# Patient Record
Sex: MALE | Race: WHITE
[De-identification: names, ages, dates, MRNs, and addresses within clinical notes are randomized per-mention and may not be internally consistent; named-entity substitution may affect disease eponyms.]

---

## 2019-02-02 ENCOUNTER — HOSPITAL ENCOUNTER (EMERGENCY)
Dept: HOSPITAL 44 - ED | Age: 29
Discharge: HOME | End: 2019-02-02
Payer: COMMERCIAL

## 2019-02-02 VITALS — SYSTOLIC BLOOD PRESSURE: 137 MMHG | DIASTOLIC BLOOD PRESSURE: 89 MMHG

## 2019-02-02 DIAGNOSIS — K59.00: Primary | ICD-10-CM

## 2019-02-02 LAB
BASOPHILS NFR BLD: 0.8 % (ref 0–1.5)
EGFR (NON-AFRICAN): > 60
EOSINOPHIL NFR BLD: 2.6 % (ref 0–6.8)
MCH RBC QN AUTO: 32.5 PG (ref 28–34)
MCV RBC AUTO: 95 FL (ref 80–100)
MONOCYTES %: 6.2 % (ref 0–11)
NEUTROPHILS #: 2.9 # K/UL (ref 1.4–7.7)

## 2019-02-02 PROCEDURE — 80053 COMPREHEN METABOLIC PANEL: CPT

## 2019-02-02 PROCEDURE — 85025 COMPLETE CBC W/AUTO DIFF WBC: CPT

## 2019-02-02 PROCEDURE — 96374 THER/PROPH/DIAG INJ IV PUSH: CPT

## 2019-02-02 PROCEDURE — 81002 URINALYSIS NONAUTO W/O SCOPE: CPT

## 2019-02-02 PROCEDURE — 99284 EMERGENCY DEPT VISIT MOD MDM: CPT

## 2019-02-02 PROCEDURE — 99283 EMERGENCY DEPT VISIT LOW MDM: CPT

## 2019-02-02 PROCEDURE — 36415 COLL VENOUS BLD VENIPUNCTURE: CPT

## 2019-02-02 PROCEDURE — S1016 NON-PVC INTRAVENOUS ADMINIST: HCPCS

## 2019-02-02 PROCEDURE — 74018 RADEX ABDOMEN 1 VIEW: CPT

## 2019-02-02 NOTE — ED PHYSICIAN DOCUMENTATION
General Adult





- HISTORIAN


Historian: patient, parent





- HPI


Stated Complaint: n/v/diarrhea


Chief Complaint: General Adult


Onset: days ago (6)


Timing: still present


Severity: moderate


Further Comments: yes (Pt is a 28 yo male with c/o n/v, cough x 1 week.  Pt has 

had some episodes diarrhea.)





- ROS


CONST: no problems


EYES/ENT: none


CVS/RESP: cough


GI/: abdominal pain, vomiting, nausea, diarrhea


MS/SKIN/LYMPH: none





- PAST HX


Past History: other (ADHD, GERD, hx behavior problems.)


Surgeries/Procedures: other (tonsillectomy)


Allergies/Adverse Reactions: 


                                    Allergies











Allergy/AdvReac Type Severity Reaction Status Date / Time


 


codeine Allergy   Verified 03/29/15 14:02














Home Medications: 


                                Ambulatory Orders











 Medication  Instructions  Recorded


 


Oxcarbazepine [Trileptal] 300 mg PO BID 03/29/15


 


Sertraline HCl [Zoloft] 100 mg PO DAILY 03/29/15


 


Omeprazole 40 mg PO D 02/02/19














- SOCIAL HX


Smoking History: non-smoker





- FAMILY HX


Family History: No





- VITAL SIGNS


Vital Signs: 





                                   Vital Signs











Temp Pulse Resp BP Pulse Ox


 


          135/78    


 


          03/29/15 15:02   














- REVIEWED ASSESSMENTS


Nursing Assessment  Reviewed: Yes


Vitals Reviewed: Yes





Progress





- Progress


Progress: 





NS 1 L IVF x 2


Zofran 4 mg IV





x-ray abd:  Increased stool in the colon.


 





Milk of Magnesia, 1  in ER, 1 --> home.

















General Adult Physical Exam





- PHYSICAL EXAM


GENERAL APPEARANCE: mild distress


EENT: pharynx normal


NECK: normal inspection, supple


RESPIRATORY: no resp distress, chest non-tender, breath sounds normal


CVS: reg rate & rhythm, heart sounds normal


ABDOMEN: soft, normal bowel sounds, tenderness (lower abd tenderness)


BACK: normal inspection, no CVA tenderness


SKIN: warm/dry, normal color


EXTREMITIES: non-tender, normal range of motion, no evidence of injury


NEURO: oriented X3, motor nml, sensation nml





Discharge


Clincal Impression: 


 constipation





Referrals: 


Primary Doctor,No [Primary Care Provider] - 


Condition: Stable


Disposition: 01 HOME, SELF-CARE


Decision to Admit: NO


Decision Time: 04:24

## 2019-02-02 NOTE — DIAGNOSTIC IMAGING REPORT
MANDO MEYERS 

[facility]

[address] [facility] [address] [address]

 

 

 

 

Report Submission Date: 2019 2:36:34 AM CST

Patient       Study

Name:   BLAINE DAVE       Date:   2019 2:03:57 AM CST

MRN:   [id number]       Modality Type:   DX

Gender:   M       Description:   ABDOMEN 1 VIEW

:   89       Institution:   [facility]

Physician:   MANDO MEYERS

     Accession:    [id number]

 

 

One view abdomen 

Clinical history:  Mid epigastric pain for 2 days. 

Findings:  Examination of the abdomen single supine view demonstrates stool in 
the proximal colon.  Psoas margins are well defined and the properitoneal fat 
lines are preserved.  The visualized visceral silhouettes are within normal 
limits. 

Impression: 

1. Increased stool in the colon.

 

Electronically signed on 2019 2:36:34 AM CST by:

Deuce ROCK

## 2019-02-04 LAB
APPEARANCE UR: CLEAR
COLOR,URINE: YELLOW
PH UR STRIP: 6.5 [PH] (ref 5–8)
RBC UR QL: NEGATIVE
UROBILINOGEN URINE: 1 EU (ref 0.2–1)